# Patient Record
Sex: FEMALE | Race: WHITE
[De-identification: names, ages, dates, MRNs, and addresses within clinical notes are randomized per-mention and may not be internally consistent; named-entity substitution may affect disease eponyms.]

---

## 2018-08-30 NOTE — EKG
Test Date:    2018-08-30               Test Time:    10:50:36

Technician:   BERNICE                                    

                                                     

MEASUREMENT RESULTS:                                       

Intervals:                                           

Rate:         52                                     

MS:           162                                    

QRSD:         76                                     

QT:           418                                    

QTc:          388                                    

Axis:                                                

P:            29                                     

MS:           162                                    

QRS:          -4                                     

T:            37                                     

                                                     

INTERPRETIVE STATEMENTS:                                       

                                                     

Sinus bradycardia

Low voltage QRS

Borderline ECG

Compared to ECG 10/13/2014 12:37:59

Low QRS voltage now present

Sinus rhythm no longer present

Sinus arrhythmia no longer present

Myocardial infarct finding no longer present



Electronically Signed On 08-30-18 16:26:06 CDT by Gato Rapp

## 2018-08-31 ENCOUNTER — HOSPITAL ENCOUNTER (OUTPATIENT)
Dept: HOSPITAL 97 - OR | Age: 68
Discharge: HOME | End: 2018-08-31
Attending: OTOLARYNGOLOGY
Payer: COMMERCIAL

## 2018-08-31 VITALS — SYSTOLIC BLOOD PRESSURE: 128 MMHG | TEMPERATURE: 97 F | DIASTOLIC BLOOD PRESSURE: 62 MMHG

## 2018-08-31 VITALS — OXYGEN SATURATION: 100 %

## 2018-08-31 DIAGNOSIS — J39.2: ICD-10-CM

## 2018-08-31 DIAGNOSIS — R13.13: Primary | ICD-10-CM

## 2018-08-31 DIAGNOSIS — R51: ICD-10-CM

## 2018-08-31 DIAGNOSIS — Z91.041: ICD-10-CM

## 2018-08-31 DIAGNOSIS — I10: ICD-10-CM

## 2018-08-31 DIAGNOSIS — Z87.891: ICD-10-CM

## 2018-08-31 DIAGNOSIS — Z88.6: ICD-10-CM

## 2018-08-31 DIAGNOSIS — K22.5: ICD-10-CM

## 2018-08-31 DIAGNOSIS — K21.9: ICD-10-CM

## 2018-08-31 DIAGNOSIS — G62.9: ICD-10-CM

## 2018-08-31 PROCEDURE — 3E0G8GC INTRODUCTION OF OTHER THERAPEUTIC SUBSTANCE INTO UPPER GI, VIA NATURAL OR ARTIFICIAL OPENING ENDOSCOPIC: ICD-10-PCS

## 2018-08-31 PROCEDURE — 93005 ELECTROCARDIOGRAM TRACING: CPT

## 2018-08-31 PROCEDURE — 43192 ESOPHAGOSCP RIG TRNSO INJECT: CPT

## 2018-08-31 RX ADMIN — MORPHINE SULFATE ONE MG: 4 INJECTION, SOLUTION INTRAMUSCULAR; INTRAVENOUS at 13:30

## 2018-08-31 RX ADMIN — MORPHINE SULFATE ONE MG: 4 INJECTION, SOLUTION INTRAMUSCULAR; INTRAVENOUS at 13:42

## 2018-08-31 NOTE — OP
Date of Procedure:  08/31/2018



Surgeon:  Kimberley Granado MD



Preoperative Diagnosis:  Pharyngeal dysphagia.



Postoperative Diagnosis:  Pharyngeal dysphagia.



Procedure:  Rigid esophagoscopy with injection of botulinum A toxin.



Indication For Procedure:  Ms. Herrera is a 68-year-old who presented with dysphagia.  She underwent a m
odified barium swallow study, which demonstrated a very small less than 1 cm Zenker's diverticulum wi
th a prominent cricopharyngeal bar.  The risks, benefits, and alternatives were discussed with the carmen handy.  After careful consideration, she opted for botulinum toxin injection.



Description Of Procedure:  The patient was brought to the operating room.  She was placed under gener
al anesthesia via oral endotracheal tube.  A shoulder roll was placed and the neck was extended but t
he head was supported.  A tooth guard was placed and a rigid cervical esophagoscope was passed throug
h the oral cavity to the hypopharynx and carefully advanced through the esophageal introitus.  The sm
all Zenker's diverticulum was noted with a prominent cricopharyngeal bar posterior to the lumen of th
e esophagus.  After confirming these findings, the esophagoscope was removed and the botulinum toxin 
was prepared in the following manner.  A 100 unit bottle was reconstituted with 1.4 mL of preservativ
e-free saline.  The bottle was gently agitated.  This resulted in 7 units per 0.1 mL of solution.  Th
is was drawn up in a 1 mL syringe and fitted with a sidekick rigid needle.  This medication was then 
placed on the surgical field.  The esophagoscope was again used to perform esophagoscopy and placed i
nto position.  The needle was advanced through the esophagoscope and injected into the left cricophar
yngeus at approximately a 4 to 5 o'clock position.  0.1 mL of botulinum solution was injected.  The n
eedle was then replaced into an 8-8:30 position and an additional 0.1 mL was injected.  Finally, the 
needle was carefully positioned at approximately 6 o'clock position and a third 0.1 mL aliquot of bot
ulin solution was injected.  The needle was removed.  The area was carefully inspected.  There was mi
nimal bleeding.  The esophagoscope was then carefully withdrawn, the tooth guard was removed, and the
 patient was returned to care of anesthesia for awakening and extubation in the operating room, which
 proceeded without difficulty.



Complications:  None.



Specimens:  None.



Disposition:  The patient will be discharged home later today in the care of her friend and follow up
 with Dr. Granado in approximately 2 weeks.





MAISHA/FELIBERTO

DD:  08/31/2018 13:03:12Voice ID:  [id number]

DT:  08/31/2018 23:30:36Report ID:  [id number]

## 2024-08-30 ENCOUNTER — HOSPITAL ENCOUNTER (EMERGENCY)
Dept: HOSPITAL 97 - ER | Age: 74
Discharge: ANOTHER HOSPITAL | End: 2024-08-30
Payer: COMMERCIAL

## 2024-08-30 VITALS — OXYGEN SATURATION: 100 % | SYSTOLIC BLOOD PRESSURE: 124 MMHG | DIASTOLIC BLOOD PRESSURE: 54 MMHG

## 2024-08-30 VITALS — TEMPERATURE: 98 F

## 2024-08-30 DIAGNOSIS — S61.431A: ICD-10-CM

## 2024-08-30 DIAGNOSIS — T63.091A: Primary | ICD-10-CM

## 2024-08-30 LAB
ALBUMIN SERPL BCP-MCNC: 3.7 G/DL (ref 3.4–5)
ALBUMIN/GLOB SERPL: 1.2 {RATIO} (ref 1.1–1.8)
ALP SERPL-CCNC: 72 U/L (ref 45–117)
ALT SERPL W P-5'-P-CCNC: 29 U/L (ref 13–56)
ANION GAP SERPL CALC-SCNC: 8 MEQ/L (ref 5–15)
APTT BLD: 26.5 SECONDS (ref 24.3–36.9)
AST SERPL W P-5'-P-CCNC: 25 U/L (ref 15–37)
BILIRUB INDIRECT SERPL-MCNC: 0.1 MG/DL (ref 0.2–0.8)
BUN BLD-MCNC: 25 MG/DL (ref 7–18)
D DIMER BLD IA.DDU-MCNC: 1.91 FEUUG/ML (ref 0–0.5)
FIBRINOGEN PPP-MCNC: 308 MG/DL (ref 178–428)
GLOBULIN SER CALC-MCNC: 3.2 G/DL (ref 2.3–3.5)
GLUCOSE SERPLBLD-MCNC: 137 MG/DL (ref 74–106)
HCT VFR BLD CALC: 33.8 % (ref 36–45)
HGB BLD-MCNC: 11.4 G/DL (ref 12–15)
INR BLD: 0.98
LYMPHOCYTES # SPEC AUTO: 3.4 K/UL (ref 0.7–4.9)
MCH RBC QN AUTO: 30.9 PG (ref 27–35)
MCHC RBC AUTO-ENTMCNC: 33.6 G/DL (ref 32–36)
MCV RBC: 91.9 FL (ref 80–100)
NRBC # BLD: 0 10*3/UL (ref 0–0)
NRBC BLD AUTO-RTO: 0.1 % (ref 0–0)
PMV BLD: 7.6 FL (ref 7.6–11.3)
POTASSIUM SERPL-SCNC: 4 MEQ/L (ref 3.5–5.1)
PROTHROMBIN TIME: 11 SECONDS (ref 9.4–12.5)
RBC # BLD: 3.68 M/UL (ref 3.86–4.86)
TROPONIN I SERPL HS-MCNC: 4.1 PG/ML (ref ?–58.9)
WBC # BLD AUTO: 8.9 THOU/UL (ref 4.3–10.9)

## 2024-08-30 PROCEDURE — 36415 COLL VENOUS BLD VENIPUNCTURE: CPT

## 2024-08-30 PROCEDURE — 85730 THROMBOPLASTIN TIME PARTIAL: CPT

## 2024-08-30 PROCEDURE — 99285 EMERGENCY DEPT VISIT HI MDM: CPT

## 2024-08-30 PROCEDURE — 84484 ASSAY OF TROPONIN QUANT: CPT

## 2024-08-30 PROCEDURE — 93005 ELECTROCARDIOGRAM TRACING: CPT

## 2024-08-30 PROCEDURE — 85384 FIBRINOGEN ACTIVITY: CPT

## 2024-08-30 PROCEDURE — 85025 COMPLETE CBC W/AUTO DIFF WBC: CPT

## 2024-08-30 PROCEDURE — 85379 FIBRIN DEGRADATION QUANT: CPT

## 2024-08-30 PROCEDURE — 80076 HEPATIC FUNCTION PANEL: CPT

## 2024-08-30 PROCEDURE — 96374 THER/PROPH/DIAG INJ IV PUSH: CPT

## 2024-08-30 PROCEDURE — 85610 PROTHROMBIN TIME: CPT

## 2024-08-30 PROCEDURE — 80048 BASIC METABOLIC PNL TOTAL CA: CPT

## 2024-08-30 NOTE — ER
Nurse's Notes                                                                                     

 Former[facility]                                                                 

Name: Jenny Herrera                                                                                  

Age: 74 yrs                                                                                       

Sex: Female                                                                                       

: 1950                                                                                   

MRN: [id number]                                                                                   

Arrival Date: 2024                                                                          

Time: 18:01                                                                                       

Account#: [id number]                                                                            

Bed 5                                                                                             

Private MD:                                                                                       

Diagnosis: Toxic effect of snake venom;Puncture wound without foreign body of right hand, initial 

  encounter                                                                                       

                                                                                                  

Presentation:                                                                                     

                                                                                             

18:16 Chief complaint: Patient states: Pt reports she was struck in the ring finger of her    kb3 

      right hand by a baby copperhead snake approximately 45 minutes PTA. Small wound noted       

      to distal digit #4 on right hand. Swelling noted in digit #4, #%, and dorsal aspect of      

      right hand. Swelling and redness marked. Coronavirus screen: Vaccine status: Patient        

      reports receiving the 2nd dose of the covid vaccine. Client denies travel out of the        

      [place] in the last 14 days. Ebola Screen: Patient negative for fever greater than or          

      equal to 101.5 degrees Fahrenheit, and additional compatible Ebola Virus Disease            

      symptoms Patient denies exposure to infectious person. Patient denies travel to an          

      Ebola-affected area in the 21 days before illness onset. Initial Sepsis Screen: Does        

      the patient meet any 2 criteria? No. Patient's initial sepsis screen is negative. Does      

      the patient have a suspected source of infection? No. Patient's initial sepsis screen       

      is negative. Risk Assessment: Do you want to hurt yourself or someone else? Patient         

      reports no desire to harm self or others. Onset of symptoms was 2024 at          

      17:30.                                                                                      

18:16 Method Of Arrival: Ambulatory                                                           3 

18:16 Acuity: VINICIO 2                                                                           kb3 

                                                                                                  

Triage Assessment:                                                                                

18:19 Bite description: bite sustained to dorsal aspect of middle phalanx of right ring       kb3 

      finger by a snake. General: Appears in no apparent distress. Behavior is calm,              

      cooperative. Pain: Complains of pain in dorsal aspect of middle phalanx of right ring       

      finger Pain does not radiate. Pain currently is 3 out of 10 on a pain scale. Quality of     

      pain is described as burning, pressure. Injury Description: Puncture sustained to           

      dorsal aspect of middle phalanx of right ring finger is snake bit, 2 small puncture         

      wounds noted was sustained 30-60 minutes ago.                                               

18:24 Bite description: animal information: Appearance: is Animal status: Baby copperhead.    kb3 

18:41 Bite description: animal information: vaccination(s) is unknown.                        mb9 

                                                                                                  

Historical:                                                                                       

- Allergies:                                                                                      

18:19 Aspirin;                                                                                kb3 

- Home Meds:                                                                                      

18:19 HTN medication [Active]; Cholesterol medication [Active]; acetaminophen-codeine 300-30  kb3 

      mg Oral tablet 1 tab as needed [Active];                                                    

- PMHx:                                                                                           

18:19 Osteoarthritis; Chronic back pain; Hypercholesterolemia; Hypertensive disorder;         kb3 

- PSHx:                                                                                           

18:19 Cholecystectomy;                                                                        kb3 

                                                                                                  

- Immunization history:: Adult Immunizations up to date, Client reports receiving the             

  2nd dose of the Covid vaccine, Last tetanus immunization: unknown.                              

- Infectious Disease History:: Denies.                                                            

- Social history:: Smoking status: Patient denies any tobacco usage or history of.                

                                                                                                  

                                                                                                  

Screenin:40 [facility] ED Fall Risk Assessment (Adult) History of falling in the last 3 months,       mb9 

      including since admission No falls in past 3 months (0 pts) Confusion or Disorientation     

      No (0 pts) Intoxicated or Sedated No (0 pts) Impaired Gait No (0 pts) Mobility Assist       

      Device Used No (0 pt) Altered Elimination No (0 pt) Score/Fall Risk Level 0 - 2 = Low       

      Risk Oriented to surroundings, Maintained a safe environment, Educated pt \T\ family on     

      fall prevention, incl call for assistance when getting out of bed. Abuse screen: Denies     

      threats or abuse. Nutritional screening: No deficits noted. Tuberculosis screening: No      

      symptoms or risk factors identified.                                                        

                                                                                                  

Assessment:                                                                                       

18:42 Pain: Complains of pain in right hand Pain radiates to right arm. Neuro: Level of       mb9 

      Consciousness is awake, alert, obeys commands, Oriented to person, place, time,             

      situation, Appropriate for age. Cardiovascular: Patient's skin is warm and dry.             

      Respiratory: Airway is patent Respiratory effort is even, unlabored, Respiratory            

      pattern is regular, symmetrical, Breath sounds are clear bilaterally. GI: Abdomen is        

      flat, non-distended. : No signs and/or symptoms were reported regarding the               

      genitourinary system. EENT: No signs and/or symptoms were reported regarding the EENT       

      system. Derm: Skin is intact, Skin is pink, warm \T\ dry. Musculoskeletal: Swelling         

      present in right hand. Injury Description: Bite sustained to right hand caused by           

      jeff head.                                                                                

19:05 Reassessment: No changes from previously documented assessment. Patient and/or family   mb9 

      updated on plan of care and expected duration. Pain level reassessed. Patient is alert,     

      oriented x 3, equal unlabored respirations, skin warm/dry/pink.                             

21:35 General: Appears in no apparent distress. Behavior is calm, cooperative. Neuro: Level   kd3 

      of Consciousness is awake, alert, obeys commands, Oriented to person, place, time,          

      situation. Cardiovascular: Patient's skin is warm and dry. Respiratory: Airway is           

      patent Trachea midline Respiratory effort is even, unlabored, Respiratory pattern is        

      regular, symmetrical.                                                                       

                                                                                                  

Vital Signs:                                                                                      

18:16  / 69; Pulse 70; Resp 18; Temp 98; Pulse Ox 99% ; Weight 65.77 kg; Height 5 ft. 2 kb3 

      in. ; Pain 3/10;                                                                            

18:38  / 54; Pulse 66; Resp 18; Pulse Ox 100% on R/A;                                   ld1 

18:16 Body Mass Index 26.52 (65.77 kg, 157.48 cm)                                             kb3 

18:16 Pain Scale: Adult                                                                       kb3 

                                                                                                  

ED Course:                                                                                        

18:16 Patient arrived in ED.                                                                  kb3 

18:19 Molina Carpenter PA is PHCP.                                                                cp  

18:19 Terrence Delaney DO is Attending Physician.                                                cp  

18:19 Triage completed.                                                                       kb3 

18:24 Arm band placed on left wrist. Patient placed in an exam room, on a stretcher, on       kb3 

      cardiac monitor, on pulse oximetry. cleaned.                                                

18:29 Lana Delaney, RN is Primary Nurse.                                                      ld1 

18:29 Lisa Tinsley, RN is Primary Nurse.                                              mb9 

18:41 Placed in gown. Bed in low position. Call light in reach. Side rails up X 1. Provided   mbMeghna 

      Education on: press call light if needing anything. Client placed on continuous cardiac     

      and pulse oximetry monitoring. NIBP monitoring applied. Cardiac monitor on.                 

18:41 Initial lab(s) drawn, by me, sent to lab. EKG done, by ED staff, reviewed by Molina rush 

      PA. Inserted saline lock: 20 gauge in right antecubital area, using aseptic technique.      

      Blood collected. Flushed with 10 mL NS.                                                     

18:43 No provider procedures requiring assistance completed.                                  mb9 

19:10 Report given to Amanda RN. Door closed. Noise minimized. Warm blanket given. mb9 

      Pillow given.                                                                               

21:36 Patient transferred, IV remains in place.                                               kd3 

                                                                                                  

Administered Medications:                                                                         

19:05 Drug: CroFab IV 6 vials IV at calculated rate once; may repeat at >=1 hour intervals    mb9 

      until initial control of symptoms Route: IV; Rate: calculated rate; Site: left forearm;     

                                                                                                  

                                                                                                  

Medication:                                                                                       

18:41 VIS not applicable for this client.                                                     mb9 

                                                                                                  

Outcome:                                                                                          

19:35 ER care complete, transfer ordered by MD. vance  

21:36 Transferred by ground EMS to [facility],                                      3 

21:36 Condition: stable                                                                           

21:36 Discharge instructions given to patient, Instructed on discharge instructions, follow       

      up and referral plans. Demonstrated understanding of instructions, follow-up care,          

      medications, Prescriptions given X 1,                                                       

21:36 Patient left the ED.                                                                    kd3 

                                                                                                  

Signatures:                                                                                       

Molina Carpenter PA PA cp Sims, Lauren, FRANCY                        RN   ld1                                                  

Yamileth Guzman RN                      RN   kd3                                                  

Louise Hyde, FRANCY                    RN   kb3                                                  

Lisa Tinsley, RN                RN   mb9                                                  

                                                                                                  

**************************************************************************************************

## 2024-08-30 NOTE — EDPHYS
Physician Documentation                                                                           

 [facility]                                                                 

Name: Jenny Herrera                                                                                  

Age: 74 yrs                                                                                       

Sex: Female                                                                                       

: 1950                                                                                   

MRN: [id number]                                                                                   

Arrival Date: 2024                                                                          

Time: 18:01                                                                                       

Account#: [id number]                                                                            

Bed 5                                                                                             

Private MD:                                                                                       

ED Physician Terrence Delaney                                                                         

HPI:                                                                                              

                                                                                             

18:25 This 74 yrs old Female presents to ER via Ambulatory with complaints of Snake bite.     cp  

18:25 The patient was bitten on the right hand, by a snake, while in the garden or yard, at     

      home. Onset: The symptoms/episode began/occurred 45 minute(s) ago.                          

18:25 Associated signs and symptoms: Pertinent positives: swelling at site, Pertinent         cp  

      negatives: fever, motor deficit, numbness distal to wound, suspected foreign body.          

      Severity of symptoms: in the emergency department the symptoms are actually worse,          

      mildly.                                                                                     

18:25 Patient is a 74-year-old female with history of hypertension who reports she was in her cp  

      yard about 45 minutes prior to arrival when she reached for plant and felt a sharp          

      prick. She looked and saw what appeared to be a copperhead snake. She has the               

      decapitated snake and a small jar that she has brought to the emergency department. She     

      started to observe swelling to the back of her right hand. Denies any significant pain      

      but admits to taking pain medication prior to arrival. Denies any shortness of breath       

      and/or chest pain.                                                                          

                                                                                                  

Historical:                                                                                       

- Allergies:                                                                                      

18:19 Aspirin;                                                                                kb3 

- Home Meds:                                                                                      

18:19 HTN medication [Active]; Cholesterol medication [Active]; acetaminophen-codeine 300-30  kb3 

      mg Oral tablet 1 tab as needed [Active];                                                    

- PMHx:                                                                                           

18:19 Osteoarthritis; Chronic back pain; Hypercholesterolemia; Hypertensive disorder;         kb3 

- PSHx:                                                                                           

18:19 Cholecystectomy;                                                                        kb3 

                                                                                                  

- Immunization history:: Adult Immunizations up to date, Client reports receiving the             

  2nd dose of the Covid vaccine, Last tetanus immunization: unknown.                              

- Infectious Disease History:: Denies.                                                            

- Social history:: Smoking status: Patient denies any tobacco usage or history of.                

                                                                                                  

                                                                                                  

ROS:                                                                                              

18:30 Constitutional: Negative for fever,                                                     cp  

18:30 Skin: Positive for of the right hand, snake bite,                                       cp  

18:30 Eyes: Negative for injury, pain, redness, and discharge,                                cp  

18:30 ENT: Negative for drainage from ear(s), ear pain, sore throat, difficulty swallowing,       

      difficulty handling secretions,                                                             

18:30 Cardiovascular: Negative for chest pain, palpitations,                                      

18:30 Respiratory: Negative for cough, shortness of breath, wheezing,                             

18:30 Abdomen/GI: Negative for abdominal pain, vomiting, diarrhea, constipation,                  

18:30 Neuro: Negative for altered mental status, dizziness, headache, weakness,                   

18:30 All other systems are negative,                                                         cp  

                                                                                                  

Exam:                                                                                             

18:35 Constitutional: The patient appears in no acute distress, alert, awake, comfortable,    cp  

      non-diaphoretic, non-toxic, well developed, well nourished,                                 

18:35 Head/Face:  Normocephalic, atraumatic.                                                  cp  

18:35 Eyes: Periorbital structures: appear normal, Conjunctiva: normal, no exudate, no            

      injection, Sclera: no appreciated abnormality, Lids and lashes: appear normal,              

      bilaterally,                                                                                

18:35 ENT: External ear(s): are unremarkable, Nose: is normal, Mouth: Lips: moist, Oral           

      mucosa: pink and intact, moist, Posterior pharynx: Airway: no evidence of obstruction,      

      patent,                                                                                     

18:35 Chest/axilla: Inspection: normal,                                                           

18:35 Cardiovascular: Rate: normal, Rhythm: regular, Edema: is not appreciated, JVD: is not       

      appreciated,                                                                                

18:35 Respiratory: the patient does not display signs of respiratory distress,  Respirations:     

      normal, no use of accessory muscles, no retractions, labored breathing, is not present,     

      Breath sounds: are clear throughout, no decreased breath sounds, no stridor, no             

      wheezing,                                                                                   

18:35 Abdomen/GI: Exam negative for discomfort, distension, guarding, Inspection: abdomen         

      appears normal,                                                                             

18:35 Back: pain, is absent, ROM is normal,                                                       

18:35 Musculoskeletal/extremity: Extremities: noted in the right hand: superficial puncture       

      wounds noted proximal right fourth finger, swelling noted of right third thru fifth         

      fingers that advances proximal to dorsum of hand, mild erythema, Perfusion: the             

      extremity is normally perfused throughout,                                                  

18:35 Neuro: Orientation: to person, place \T\ time. Mentation: is normal, Motor: moves all       

      fours, strength is normal, Sensation: no obvious gross deficits,                            

18:40 ECG was reviewed by the Attending Physician.                                            cp  

                                                                                                  

Vital Signs:                                                                                      

18:16  / 69; Pulse 70; Resp 18; Temp 98; Pulse Ox 99% ; Weight 65.77 kg; Height 5 ft. 2 kb3 

      in. ; Pain 3/10;                                                                            

18:38  / 54; Pulse 66; Resp 18; Pulse Ox 100% on R/A;                                   ld1 

18:16 Body Mass Index 26.52 (65.77 kg, 157.48 cm)                                             kb3 

18:16 Pain Scale: Adult                                                                       kb3 

                                                                                                  

MDM:                                                                                              

19:35 Patient medically screened.                                                             cp  

19:45 Data reviewed: vital signs, nurses notes, lab test result(s), I have discussed the      cp  

      patient's presentation/case with the attending Emergency Department Physician; and as a     

      result, I will transfer patient.                                                            

19:45 Differential diagnosis: cellulitis, sepsis, toxic envenomation, non-toxic envenomation. cp  

      I considered the following discharge prescriptions or medication management in the          

      emergency department Medications were administered in the Emergency Department. See         

      MAR. Independent interpretation of the following test(s) in the Emergency Department        

      EKG: See my EKG interpretation above. Care significantly affected by the following          

      chronic conditions: Hypertension. Counseling: I had a detailed discussion with the          

      patient and/or guardian regarding the historical points, exam findings, and any             

      diagnostic results supporting the discharge/admit diagnosis, lab results, the need to       

      transfer to another facility, for higher level of care. Response to treatment: the          

      patient's symptoms have mildly improved after treatment.                                    

                                                                                                  

                                                                                             

18:22 Order name: Basic Metabolic Panel; Complete Time: 19:44                                 cp  

                                                                                             

19:44 Interpretation: Normal except: ; GLUC 137; BUN 25; CRE 2.03; GFR 25.              cp  

                                                                                             

18:22 Order name: CBC with Diff; Complete Time: 19:44                                         cp  

                                                                                             

19:44 Interpretation: Normal except: RBC 3.68; HGB 11.4; HCT 33.8.                            cp  

                                                                                             

18:22 Order name: LFT's; Complete Time: 19:44                                                 cp  

                                                                                             

18:22 Order name: PT-INR; Complete Time: 19:44                                                cp  

                                                                                             

18:22 Order name: Troponin HS; Complete Time: 19:44                                           cp  

                                                                                             

18:22 Order name: Ptt, Activated; Complete Time: 19:44                                        cp  

                                                                                             

18:22 Order name: Fibrinogen; Complete Time: 19:44                                            cp  

                                                                                             

19:45 Interpretation: Within normal limits.                                                   cp  

                                                                                             

18:22 Order name: D-Dimer; Complete Time: 19:44                                               cp  

                                                                                             

19:45 Interpretation: Abnormal: D-DIMER 1.914.                                                cp  

                                                                                             

18:22 Order name: Cardiac monitoring; Complete Time: 18:38                                    cp  

                                                                                             

18:22 Order name: EKG - Nurse/Tech; Complete Time: 18:38                                      cp  

                                                                                             

18:22 Order name: IV Saline Lock; Complete Time: 18:29                                        cp  

                                                                                             

18:22 Order name: Labs collected and sent; Complete Time: 18:29                               cp  

                                                                                             

18:22 Order name: O2 Per Protocol; Complete Time: 18:26                                       cp  

                                                                                             

18:22 Order name: O2 Sat Monitoring; Complete Time: 18:26                                     cp  

                                                                                                  

EC:40 Rate is 62 beats/min. Rhythm is regular. [place] interval is normal. QRS interval is normal. cp  

      QT interval is normal. T waves are Inverted in lead aVR. Interpreted by me. Reviewed by     

      me.                                                                                         

                                                                                                  

Administered Medications:                                                                         

19:05 Drug: CroFab IV 6 vials IV at calculated rate once; may repeat at >=1 hour intervals    mb9 

      until initial control of symptoms Route: IV; Rate: calculated rate; Site: left forearm;     

                                                                                                  

                                                                                                  

Disposition:                                                                                      

21:36 I reviewed the patient's care provided by Advanced Practice Provider \T\ agree w/ the     ms3

      diagnosis \T\ care plan. I personally saw the pt \T\ performed a substantive portion of the 

      visit, incldng all aspects of the (History/Exam/Medical Decision Making). PA/NP's           

      history reviewed, patient interviewed, and examined. HPI: 74-year-old female presents       

      emergency department 45 minutes after being bitten by a copperhead on the right fourth      

      digit with hands swelling. My personal exam of patient reveals: On exam patient is          

      alert and orient x 4, no apparent distress, nontoxic-appearing, speaking full               

      sentences. Heart rate and rhythm are regular without murmurs rubs or gallops. Lungs are     

      clear to auscultation bilaterally. Patient's right fourth digit and proximal hand with      

      erythema and swelling. Agree with administration of CroFab I agree with assessment and      

      care plan and confirm the diagnosis (es) above.                                             

                                                                                                  

Disposition Summary:                                                                              

24 19:35                                                                                    

Transfer Ordered                                                                                  

 Notes:       Reason: Higher level of care                                                          
  cp

      Condition: Stable                                                                       cp  

      Problem: new                                                                            cp  

      Symptoms: have improved                                                                 cp  

      Transfer Location: [facility](24 21:05)                              cp  

      Accepting Physician: DR Larkin(24 21:36)                                             kd3 

      Diagnosis                                                                                   

        - Toxic effect of snake venom                                                         cp  

        - Puncture wound without foreign body of right hand, initial encounter                cp  

      Forms:                                                                                      

        - Medication Reconciliation Form                                                      cp  

        - SBAR form                                                                           cp  

Critical care time excluding procedures:                                                          

21:36 Critical care time: Bedside Care: 40 minutes. Total time: 40 minutes                    ms3 

                                                                                                  

Signatures:                                                                                       

Dispatcher [employer]                           EDMS                                                 

Molina Carpenter PA PA   cp                                                   

Terrence Delaney,                         DO   ms3                                                  

Yamileth Guzman, RN                      RN   kd3                                                  

Louise Hyde, RN                    RN   kb3                                                  

Lisa Tinsley, RN                RN   mb9                                                  

                                                                                                  

Corrections: (The following items were deleted from the chart)                                    

18:23 18:23 BASIC METABOLIC PANEL+C.LAB.BRZ ordered. EDMS                                     EDMS

18:23 18:23 CBC+H.LAB.BRZ ordered. EDMS                                                       EDMS

18:23 18:23 HEPATIC FUNCTION+C.LAB.BRZ ordered. EDMS                                          EDMS

18:23 18:23 PROTIME (+INR)+COAG.LAB.BRZ ordered. EDMS                                         EDMS

18:23 18:23 Troponin High Sensitivity+C.LAB.BRZ ordered. EDMS                                 EDMS

18:23 18:23 PTT, ACTIVATED+COAG.LAB.BRZ ordered. EDMS                                         EDMS

18:23 18:23 FIBRINOGEN+COAG.LAB.BRZ ordered. EDMS                                             EDMS

18:23 18:23 D-DIMER+COAG.LAB.BRZ ordered. EDMS                                                EDMS

21:05 19:35 Doctor cp                                                                         cp  

21:05 19:35 [facility] cp                                          cp  

21:36 21:05 DR Larkin cp                                                                         kd3 

21:38 19:53 I was immediately available on-site in the Emergency Department for consultation  ms3 

      in the care of the patient. ms3                                                             

                                                                                                  

**************************************************************************************************

## 2024-09-03 NOTE — EKG
Test Date:    2024-08-30               Test Time:    18:34:01

Technician:   OH                                     

                                                     

MEASUREMENT RESULTS:                                       

Intervals:                                           

Rate:         62                                     

CT:           188                                    

QRSD:         68                                     

QT:           388                                    

QTc:          393                                    

Axis:                                                

P:            41                                     

CT:           188                                    

QRS:          -18                                    

T:            47                                     

                                                     

INTERPRETIVE STATEMENTS:                                       

                                                     

Normal sinus rhythm

Low voltage QRS

Borderline ECG

Compared to ECG 08/30/2018 10:50:36

Sinus bradycardia no longer present



Electronically Signed On 09-03-24 12:42:32 CDT by Rikki Pena